# Patient Record
Sex: MALE | Race: WHITE | NOT HISPANIC OR LATINO | Employment: FULL TIME | URBAN - METROPOLITAN AREA
[De-identification: names, ages, dates, MRNs, and addresses within clinical notes are randomized per-mention and may not be internally consistent; named-entity substitution may affect disease eponyms.]

---

## 2024-03-12 ENCOUNTER — TELEPHONE (OUTPATIENT)
Dept: TRANSPLANT | Facility: CLINIC | Age: 39
End: 2024-03-12

## 2024-03-12 NOTE — TELEPHONE ENCOUNTER
----- Message from Elvia Arevalo sent at 3/12/2024  4:59 PM CDT -----    HCV referral rec'becky and scanned into media. Sent to HCV staff for scheduling.     Referring Provider: Fuad Lantigua MD  Phone: [phone]  Fax: [phone]  .

## 2024-03-13 ENCOUNTER — TELEPHONE (OUTPATIENT)
Dept: HEPATOLOGY | Facility: CLINIC | Age: 39
End: 2024-03-13
Payer: COMMERCIAL

## 2024-03-13 NOTE — TELEPHONE ENCOUNTER
----- Message from Elvia Arevalo sent at 3/12/2024  4:59 PM CDT -----    HCV referral rec'becky and scanned into media. Sent to HCV staff for scheduling.     Referring Provider: Fuad Lantigua MD  Phone: [phone]  Fax: [phone]  .

## 2024-03-14 ENCOUNTER — PROCEDURE VISIT (OUTPATIENT)
Dept: HEPATOLOGY | Facility: CLINIC | Age: 39
End: 2024-03-14
Payer: COMMERCIAL

## 2024-03-14 ENCOUNTER — LAB VISIT (OUTPATIENT)
Dept: LAB | Facility: HOSPITAL | Age: 39
End: 2024-03-14
Payer: COMMERCIAL

## 2024-03-14 ENCOUNTER — OFFICE VISIT (OUTPATIENT)
Dept: HEPATOLOGY | Facility: CLINIC | Age: 39
End: 2024-03-14
Payer: COMMERCIAL

## 2024-03-14 VITALS — BODY MASS INDEX: 41.37 KG/M2 | HEIGHT: 70 IN | WEIGHT: 289 LBS

## 2024-03-14 DIAGNOSIS — R74.8 ABNORMAL TRANSAMINASES: ICD-10-CM

## 2024-03-14 DIAGNOSIS — E66.01 CLASS 3 SEVERE OBESITY WITH BODY MASS INDEX (BMI) OF 40.0 TO 44.9 IN ADULT, UNSPECIFIED OBESITY TYPE, UNSPECIFIED WHETHER SERIOUS COMORBIDITY PRESENT: ICD-10-CM

## 2024-03-14 DIAGNOSIS — B18.2 CHRONIC HEPATITIS C WITHOUT HEPATIC COMA: ICD-10-CM

## 2024-03-14 DIAGNOSIS — B18.2 CHRONIC HEPATITIS C WITHOUT HEPATIC COMA: Primary | ICD-10-CM

## 2024-03-14 LAB
HAV IGG SER QL IA: NORMAL
HBV CORE AB SERPL QL IA: NORMAL
HBV SURFACE AB SER-ACNC: 93.58 MIU/ML
HBV SURFACE AB SER-ACNC: REACTIVE M[IU]/ML
HIV 1+2 AB+HIV1 P24 AG SERPL QL IA: NORMAL

## 2024-03-14 PROCEDURE — 99204 OFFICE O/P NEW MOD 45 MIN: CPT | Mod: S$GLB,,, | Performed by: PHYSICIAN ASSISTANT

## 2024-03-14 PROCEDURE — 86706 HEP B SURFACE ANTIBODY: CPT | Performed by: PHYSICIAN ASSISTANT

## 2024-03-14 PROCEDURE — 1160F RVW MEDS BY RX/DR IN RCRD: CPT | Mod: CPTII,S$GLB,, | Performed by: PHYSICIAN ASSISTANT

## 2024-03-14 PROCEDURE — 87389 HIV-1 AG W/HIV-1&-2 AB AG IA: CPT | Performed by: PHYSICIAN ASSISTANT

## 2024-03-14 PROCEDURE — 91200 LIVER ELASTOGRAPHY: CPT | Mod: S$GLB,,, | Performed by: PHYSICIAN ASSISTANT

## 2024-03-14 PROCEDURE — 1159F MED LIST DOCD IN RCRD: CPT | Mod: CPTII,S$GLB,, | Performed by: PHYSICIAN ASSISTANT

## 2024-03-14 PROCEDURE — 3008F BODY MASS INDEX DOCD: CPT | Mod: CPTII,S$GLB,, | Performed by: PHYSICIAN ASSISTANT

## 2024-03-14 PROCEDURE — 86704 HEP B CORE ANTIBODY TOTAL: CPT | Performed by: PHYSICIAN ASSISTANT

## 2024-03-14 PROCEDURE — 36415 COLL VENOUS BLD VENIPUNCTURE: CPT | Performed by: PHYSICIAN ASSISTANT

## 2024-03-14 PROCEDURE — 81596 NFCT DS CHRNC HCV 6 ASSAYS: CPT | Performed by: PHYSICIAN ASSISTANT

## 2024-03-14 PROCEDURE — 86790 VIRUS ANTIBODY NOS: CPT | Performed by: PHYSICIAN ASSISTANT

## 2024-03-14 PROCEDURE — 99999 PR PBB SHADOW E&M-EST. PATIENT-LVL III: CPT | Mod: PBBFAC,,, | Performed by: PHYSICIAN ASSISTANT

## 2024-03-14 RX ORDER — LISINOPRIL 20 MG/1
1 TABLET ORAL DAILY
COMMUNITY

## 2024-03-14 NOTE — PROCEDURES
FibroScan (Vibration Controlled Transient Elastography)    Date/Time: 3/14/2024 10:00 AM    Performed by: Scheuermann, Jennifer B., PA  Authorized by: Scheuermann, Jennifer B., PA    Diagnosis:  HCV    Probe:  XL    Universal Protocol: Patient's identity, procedure and site were verified, confirmatory pause was performed.  Discussed procedure including risks and potential complications.  Questions answered.  Patient verbalizes understanding and wishes to proceed with VCTE.     Procedure: After providing explanations of the procedure, patient was placed in the supine position with right arm in maximum abduction to allow optimal exposure of right lateral abdomen.  Patient was briefly assessed, Testing was performed in the mid-axillary location, 50Hz Shear Wave pulses were applied and the resulting Shear Wave and Propagation Speed detected with a 3.5 MHz ultrasonic signal, using the FibroScan probe, Skin to liver capsule distance and liver parenchyma were accessed during the entire examination with the FibroScan probe, Patient was instructed to breathe normally and to abstain from sudden movements during the procedure, allowing for random measurements of liver stiffness. At least 10 Shear Waves were produced, Individual measurements of each Shear Wave were calculated.  Patient tolerated the procedure well with no complications.  Meets discharge criteria as was dismissed.  Rates pain 0 out of 10.  Patient will follow up with ordering provider to review results.    Findings  Median liver stiffness score:  11.5  CAP Reading: dB/m:  263    IQR/med %:  15  Interpretation  Fibrosis interpretation is based on medial liver stiffness - Kilopascal (kPa).    Fibrosis Stage:  F3  Steatosis interpretation is based on controlled attenuation parameter - (dB/m).    Steatosis Grade:  S2

## 2024-03-14 NOTE — PROGRESS NOTES
HEPATOLOGY CLINIC VISIT NOTE - HCV clinic  OUTSIDE REFERRING PROVIDER: uFad Lantigua MD  CHIEF COMPLAINT: Hepatitis C   (accompanied by: wife)    HISTORY       This is a 39 y.o. White male with chronic hepatitis C, here for further eval / mngmt. Outside records have been received / reviewed.      HCV history:  Recent diagnosis following eval of abnl liver panel  Prior icteric illnesses: None    Risks for HCV:  Blood transfusion shortly after birth    - Prior Treatment: No  - Genotype 1A  - NS5A resistance not known  - HCV ,000 IU/mL - 2/2024    Liver staging:  No formal liver staging  No liver imaging  Labs - no evidence of liver dysfunction      Denies jaundice, dark urine, abdominal distention, hematemesis, melena, slowed mentation.   No abnormal skin rashes. No generalized joint pain.     PMH, PSH, SOCIAL HX, FAMILY HX      Reviewed in Epic  Pertinent findings:  FAMILY HX: neg for liver diease  SOCIAL HX: , 4 y/o son. Resides in [place]  Alcohol - no  Drugs - no    ROS: as per HPI, in addition:  (+) back pain    PHYSICAL EXAM:  Friendly White male, in no acute distress; alert and oriented to person, place and time. BMI 41  HEENT: Sclerae anicteric.   NECK: Supple  LUNGS: Normal respiratory effort.   ABDOMEN: Flat, soft, nontender. No organomegaly or masses. No ascites or hernias.   SKIN: Warm and dry. No jaundice, No obvious rashes.   EXTREMITIES: No lower extremity edema  NEURO/PSYCH: Normal gate. Memory intact. Thought and speech pattern appropriate. Behavior normal. No depression or anxiety noted.    PERTINENT DIAGNOSTIC RESULTS      Outside labs: 2/22/24            HBsAg neg      ASSESSMENT        39 y.o. White male with:  1. Chronic HCV, genotype 1A  - treatment naive  -- Elevated transaminases  -- HAV status - Unknown  -- HBV status - Unknown    2. Obesity w/ BMI 41    PLAN      1. Labs today  2. FibroScan today  3. U/S soon.  4. F/U visit in ~ 4-6 wks.     Goal of HCV Rx after liver  staging  Will attempt staging w/ fibroscan b/c ribs palpable on exam, but elevated BMI noted and will consider MR Elastography if results not clear     Orders Placed This Encounter   Procedures    FibroScan Transplant Hepatology(Vibration Controlled Transient Elastography)    US Abdomen Complete    Hepatitis B Surface Ab, Qualitative    Hepatitis B Core Antibody, Total    Hepatitis A antibody, IgG    HCV Fibrosure    HIV 1/2 Ag/Ab (4th Gen)     ____________________________________________________________  EDUCATION:  The natural history of Hepatitis C, including potential progression to cirrhosis was reviewed. We discussed the increased progression of liver disease secondary to alcohol use; patient was advised to avoid alcohol completely.     Transmission of Hepatitis C was reviewed, including possible sexual transmission. Sexual contacts should be screened. Patient should avoid sharing personal products such as razors, toothbrushes, etc.     Recommend avoiding raw seafood.  Limit acetaminophen to 2000mg daily.  ____________________________________________________________    Duration of encounter: 55 min  This includes face-to-face time and non face-to-face time preparing to see the patient (eg, review of tests), obtaining and/or reviewing separately obtained history, documenting clinical information in the electronic or other health record, independently interpreting resultsand communicating results to the patient/family/caregiver, or care coordination.

## 2024-03-15 LAB
A2 MACROGLOB SERPL-MCNC: 262 MG/DL (ref 100–280)
ALT SERPL W P-5'-P-CCNC: 89 U/L (ref 7–55)
APO A-I SERPL-MCNC: 110 MG/DL
BILIRUB SERPL-MCNC: 0.9 MG/DL (ref 0–1.2)
BIOPREDICTIVE SERIAL NUMBER: ABNORMAL
FIBROSIS STAGE SERPL QL: ABNORMAL
FIBROTEST INTERPRETATION: ABNORMAL
FIBROTEST-ACTITEST COMMENT: ABNORMAL
GGT SERPL-CCNC: 163 U/L (ref 8–61)
HAPTOGLOB SERPL-MCNC: <14 MG/DL (ref 30–200)
LIVER FIBR SCORE SERPL CALC.FIBROSURE: 0.87
NECROINFLAMMAT INTERP: ABNORMAL
NECROINFLAMMATORY ACT GRADE SERPL QL: ABNORMAL
NECROINFLAMMATORY ACT SCORE SERPL: 0.72

## 2024-04-11 ENCOUNTER — LAB VISIT (OUTPATIENT)
Dept: LAB | Facility: HOSPITAL | Age: 39
End: 2024-04-11
Payer: COMMERCIAL

## 2024-04-11 ENCOUNTER — OFFICE VISIT (OUTPATIENT)
Dept: HEPATOLOGY | Facility: CLINIC | Age: 39
End: 2024-04-11
Payer: COMMERCIAL

## 2024-04-11 ENCOUNTER — TELEPHONE (OUTPATIENT)
Dept: HEPATOLOGY | Facility: CLINIC | Age: 39
End: 2024-04-11
Payer: COMMERCIAL

## 2024-04-11 VITALS — BODY MASS INDEX: 41.47 KG/M2 | WEIGHT: 289.69 LBS | HEIGHT: 70 IN

## 2024-04-11 DIAGNOSIS — K74.02 ADVANCED HEPATIC FIBROSIS: ICD-10-CM

## 2024-04-11 DIAGNOSIS — B18.2 CHRONIC HEPATITIS C WITHOUT HEPATIC COMA: Primary | ICD-10-CM

## 2024-04-11 DIAGNOSIS — K76.0 FATTY LIVER: ICD-10-CM

## 2024-04-11 DIAGNOSIS — K74.02 ADVANCED HEPATIC FIBROSIS: Primary | ICD-10-CM

## 2024-04-11 LAB — AFP SERPL-MCNC: 6.1 NG/ML (ref 0–8.4)

## 2024-04-11 PROCEDURE — 4010F ACE/ARB THERAPY RXD/TAKEN: CPT | Mod: CPTII,S$GLB,, | Performed by: PHYSICIAN ASSISTANT

## 2024-04-11 PROCEDURE — 99214 OFFICE O/P EST MOD 30 MIN: CPT | Mod: S$GLB,,, | Performed by: PHYSICIAN ASSISTANT

## 2024-04-11 PROCEDURE — 1160F RVW MEDS BY RX/DR IN RCRD: CPT | Mod: CPTII,S$GLB,, | Performed by: PHYSICIAN ASSISTANT

## 2024-04-11 PROCEDURE — 3008F BODY MASS INDEX DOCD: CPT | Mod: CPTII,S$GLB,, | Performed by: PHYSICIAN ASSISTANT

## 2024-04-11 PROCEDURE — 1159F MED LIST DOCD IN RCRD: CPT | Mod: CPTII,S$GLB,, | Performed by: PHYSICIAN ASSISTANT

## 2024-04-11 PROCEDURE — 82105 ALPHA-FETOPROTEIN SERUM: CPT | Performed by: PHYSICIAN ASSISTANT

## 2024-04-11 PROCEDURE — 36415 COLL VENOUS BLD VENIPUNCTURE: CPT | Performed by: PHYSICIAN ASSISTANT

## 2024-04-11 PROCEDURE — 99999 PR PBB SHADOW E&M-EST. PATIENT-LVL III: CPT | Mod: PBBFAC,,, | Performed by: PHYSICIAN ASSISTANT

## 2024-04-11 RX ORDER — GLECAPREVIR AND PIBRENTASVIR 40; 100 MG/1; MG/1
3 TABLET, FILM COATED ORAL DAILY
Qty: 84 TABLET | Refills: 1 | Status: ACTIVE | OUTPATIENT
Start: 2024-04-11

## 2024-04-11 NOTE — PROGRESS NOTES
HEPATOLOGY CLINIC VISIT NOTE - HCV clinic  CHIEF COMPLAINT: Hepatitis C   (accompanied by: wife)    HISTORY       This is a 39 y.o. White male with chronic hepatitis C, here for f/u    HCV history:  Recent diagnosis following eval of abnl liver panel  Prior icteric illnesses: None  Risks for HCV:  Blood transfusion shortly after birth  - Prior Treatment: No  - Genotype 1A  - NS5A resistance not known  - HCV ,000 IU/mL - 2/2024    Liver staging:  No formal liver staging  No liver imaging  Labs - no evidence of liver dysfunction      Interval history (4/11/24):  Labs: (+) HBV immunity through vaccine, Lacking HAV immunity, HIV neg    Evidence of advanced liver fibrosis:   FibroSURE: F4, FibroScan: F3, SM 15cm   Liver disease well compensated   ?portal HTN: SM but plt normal    Evidence of steatotic liver: S2 on FibroScan    Current symptoms of hepatic decompensation:  Ascites / IRINA - no   Diuretic use - no  TBili elevation - no  HE / confusion / memory problems - no  EV bleed / hematemesis / melena - no      PMH, PSH, SOCIAL HX, FAMILY HX      Reviewed in Epic  Pertinent findings:  FAMILY HX: neg for liver diease  SOCIAL HX: , 4 y/o son. Resides in [place]  Alcohol - no  Drugs - no    ROS: as per HPI    PHYSICAL EXAM:  Friendly White male, in no acute distress; alert and oriented to person, place and time. BMI 41  HEENT: Sclerae anicteric.   NECK: Supple  LUNGS: Normal respiratory effort.   ABDOMEN: Flat, soft, nontender.   SKIN: Warm and dry. No jaundice, No obvious rashes.   EXTREMITIES: No lower extremity edema  NEURO/PSYCH: Normal gate. Memory intact. Thought and speech pattern appropriate. Behavior normal. No depression or anxiety noted.    PERTINENT DIAGNOSTIC RESULTS      FibroScan 3/14/24: kPa 11.5 (F3),  (S2)    Outside labs: 2/22/24              HBsAg neg      US Abdomen Complete - 4/2/24  CLINICAL HISTORY:  Chronic viral hepatitis CHCV; with spleen size for portal HTN  assessment;    FINDINGS:  The liver demonstrates a diffusely increased echotexture without focal lesion. No gallstones. The common duct measures 5 mm. The hepatic and portal veins are patent.  There is no hydronephrosis.  Visualized portions of the pancreas unremarkable.  Mild splenomegaly measuring 15 cm in length.  The visualized portions of the abdominal aorta and IVC show no gross abnormality.    Impression  1. Increased hepatic echotexture suggesting steatosis or hepatocellular disease.  2. Mild splenomegaly.      ASSESSMENT        39 y.o. White male with:  1. Chronic HCV, genotype 1A  - treatment naive  -- Elevated transaminases  -- HAV status - Lacking immunity  -- HBV status - Prior vaccination w/ immunity    2. Advanced liver fibrosis / early cirrhosis  -- FibroScan F3, FibroSURE F4  -- HCC screen: U/S 4/2024 ok, needs AFP  -- Portal HTN?: (+) SM, needs EGD    3. MASLD  -- Obesity w/ BMI 41  -- Elevated transaminases    PLAN      1. Labs today: AFP  -- HCC screening every 6 months, due 10/2024 if AFP ok.  2. Mavyret x 8 weeks sent to [facility]   -- Patient will notify me of treatment start date so lab f/u can be scheduled.  3. HAV vaccine sent to [facility]. See PCP if not covered.   4. F/U visit during HCV Rx: Further discuss cirrhosis, need for EGD, fatty liver    Discussed presence of advanced liver fibrosis, risk of HCC, need for 6 month monitoring    Cc: Fuad Lantigua MD    ______________________________________________________________________________  EDUCATION:  HCV RX  Discussed goal of HCV eradication to prevent progression of liver disease.  Discussed use of Mavyret (3 pills daily with food) x 8 weeks with potential side effects of fatigue, headache.    Discussed potential for drug interactions with Mavyret.  Current med list reviewed - no interactions noted.    Discussed importance of medication adherence and risk of treatment failure / viral resistance if not adherent. Pt has  verbalized understanding.    ____________________________________________________________    Duration of encounter: 37 min  This includes face-to-face time and non face-to-face time preparing to see the patient (eg, review of tests), obtaining and/or reviewing separately obtained history, documenting clinical information in the electronic or other health record, independently interpreting resultsand communicating results to the patient/family/caregiver, or care coordination.

## 2024-04-11 NOTE — TELEPHONE ENCOUNTER
----- Message from Jennifer B. Scheuermann, PA sent at 4/11/2024  2:13 PM CDT -----  Pls schedule in 10/2024: CBC, CMP,  INR, AFP, U/S, VISIT (can be video visit if easier)

## 2024-04-11 NOTE — TELEPHONE ENCOUNTER
I spoke with patient.  Testing scheduled 10/1 and f/u visit scheduled 10/4; appt reminder notice mailed.

## 2024-04-25 ENCOUNTER — TELEPHONE (OUTPATIENT)
Dept: HEPATOLOGY | Facility: CLINIC | Age: 39
End: 2024-04-25
Payer: COMMERCIAL

## 2024-04-25 DIAGNOSIS — B18.2 CHRONIC HEPATITIS C WITHOUT HEPATIC COMA: Primary | ICD-10-CM

## 2024-04-25 NOTE — TELEPHONE ENCOUNTER
I spoke with patient and msg from PA Scheuermann relayed and mailed to him.  Labs scheduled 5/23/24 and f/u visit scheduled 5/24/24.  SVR labs added to draw already scheduled on 10/1/24.

## 2024-04-25 NOTE — TELEPHONE ENCOUNTER
Pt beginning 8 weeks Mavyret on 4/26/24  Anticipated treatment end date: 6/20/24  F 3-4  Allegra 1A  Prior HCV treatment: No      Pls update episode and schedule:  - LFT, HCV RNA at week ~ 4  - visit (video is fine if easier) in ~ 4-5 wks  - LFT, HCV RNA - SVR12 - 9/20/24

## 2024-05-24 ENCOUNTER — OFFICE VISIT (OUTPATIENT)
Dept: HEPATOLOGY | Facility: CLINIC | Age: 39
End: 2024-05-24
Payer: COMMERCIAL

## 2024-05-24 DIAGNOSIS — E66.01 CLASS 3 SEVERE OBESITY WITH BODY MASS INDEX (BMI) OF 40.0 TO 44.9 IN ADULT, UNSPECIFIED OBESITY TYPE, UNSPECIFIED WHETHER SERIOUS COMORBIDITY PRESENT: ICD-10-CM

## 2024-05-24 DIAGNOSIS — R74.8 ABNORMAL TRANSAMINASES: ICD-10-CM

## 2024-05-24 DIAGNOSIS — K76.0 FATTY LIVER: ICD-10-CM

## 2024-05-24 DIAGNOSIS — B18.2 CHRONIC HEPATITIS C WITHOUT HEPATIC COMA: ICD-10-CM

## 2024-05-24 DIAGNOSIS — K74.60 HEPATIC CIRRHOSIS, UNSPECIFIED HEPATIC CIRRHOSIS TYPE, UNSPECIFIED WHETHER ASCITES PRESENT: Primary | ICD-10-CM

## 2024-05-24 PROCEDURE — 99214 OFFICE O/P EST MOD 30 MIN: CPT | Mod: 95,,, | Performed by: PHYSICIAN ASSISTANT

## 2024-05-24 PROCEDURE — 1159F MED LIST DOCD IN RCRD: CPT | Mod: CPTII,95,, | Performed by: PHYSICIAN ASSISTANT

## 2024-05-24 PROCEDURE — 1160F RVW MEDS BY RX/DR IN RCRD: CPT | Mod: CPTII,95,, | Performed by: PHYSICIAN ASSISTANT

## 2024-05-24 PROCEDURE — 4010F ACE/ARB THERAPY RXD/TAKEN: CPT | Mod: CPTII,95,, | Performed by: PHYSICIAN ASSISTANT

## 2024-05-24 NOTE — PROGRESS NOTES
HEPATOLOGY VIDEO VISIT NOTE - HCV clinic  Visit type: audiovisual    Each patient to whom he or she provides medical services by telemedicine is:  (1) informed of the relationship between the physician and patient and the respective role of any other health care provider with respect to management of the patient; and (2) notified that he or she may decline to receive medical services by telemedicine and may withdraw from such care at any time.    CHIEF COMPLAINT: Hepatitis C, Cirrhosis     HISTORY       This is a 39 y.o. White male with advanced liver fibrosis, HCV, being seen for f/u    HCV history:  Recent diagnosis following eval of abnl liver panel  Prior icteric illnesses: None  Risks for HCV:  Blood transfusion shortly after birth  - Prior Treatment: No  - Genotype 1A  - NS5A resistance not known  - HCV ,000 IU/mL - 2/2024    Cirrhosis history:  FibroScan 3/14/24: kPa 11.5 (F3),  (S2)  FibroSURE 3/14/24: A3, F4  Liver disease is well compensated  (+) Portal HTN: SM 15cm. Normal plt    Cirrhosis maintenance:  Varices screening - EGD needed  HAV immunity - Lacking immunity - Rx prescribed last visit  HBV status - Prior vaccination w/ immunity: Pos HBsAb (neg HBsAg, neg HBcAb)      Interval history (4/11/24):  Staging tests confirm advanced liver fibrosis: F3-4, concern for portal HTN  No s/s liver decompensation  Mavyret for HCV prescribed    Interval history (5/24/24):  Began 8 weeks mavyret on 4/26/24   Virologic response: RNA pending   Adherence issues: no   Tolerability issues: no    AFP after last visit: normal  Denies jaundice, dark urine, hematemesis, melena, slowed mentation, abdominal distention.       PMH, PSH, SOCIAL HX, FAMILY HX      Reviewed in Epic  Pertinent findings:  FAMILY HX: neg for liver diease  SOCIAL HX: , 4 y/o son. Resides in [place]  Alcohol - no  Drugs - no    ROS: as per HPI    PHYSICAL EXAM:  Friendly White male, in no acute distress; alert and oriented to person,  place and time  LUNGS: Normal respiratory effort.  NEURO/PSYCH: Memory intact. Thought and speech pattern appropriate. Behavior normal. No depression or anxiety noted.    PERTINENT DIAGNOSTIC RESULTS      Outside labs: 2/22/24              HBsAg neg      US Abdomen Complete - 4/2/24  CLINICAL HISTORY:  Chronic viral hepatitis CHCV; with spleen size for portal HTN assessment;    FINDINGS:  The liver demonstrates a diffusely increased echotexture without focal lesion. No gallstones. The common duct measures 5 mm. The hepatic and portal veins are patent.  There is no hydronephrosis.  Visualized portions of the pancreas unremarkable.  Mild splenomegaly measuring 15 cm in length.  The visualized portions of the abdominal aorta and IVC show no gross abnormality.    Impression  1. Increased hepatic echotexture suggesting steatosis or hepatocellular disease.  2. Mild splenomegaly.      ASSESSMENT        39 y.o. White male with:  1. Chronic HCV, genotype 1A  - on mavyret  -- Elevated transaminases - improving on Rx    2. Advanced liver fibrosis / early cirrhosis  -- FibroScan F3, FibroSURE F4  -- HCC screen: Up to date 4/2024  -- Portal HTN?: (+) SM, needs EGD    3. MASLD: Metabolic dysfunction associated steatotic liver disease  -- Obesity w/ BMI 41  -- Elevated transaminases  -- Recent lipid panel ok  -- no DM    PLAN      1. Continue Mavyret x 8 weeks w/ labs for SVR as planned  2. EGD for EV screen: pt to see if Dr Lantigua can do this; if not will schedule at [facility]  3. CBC, CMP, INR, AFP, U/S, VISIT every 6 months: due 10/2024.   4. Discussed goal of wt loss for mngmt of MASLD. Specifically discussed diet changes, avoiding sugary / high calorie drinks    Cc: Fuad Lantigua MD    ______________________________________________________________________________  EDUCATION:  CIRRHOSIS  Discussed evidence that indicates that pt has cirrhosis.   -- Discussed liver fibrosis/scarring and relation to liver function. Reviewed  significance of MELD scoring system as it relates to indication for transplant.  -- Signs and symptoms of hepatic decompensation were reviewed, including jaundice, ascites, and slowed mentation due to hepatic encephalopathy. The patient should seek medical attention if any of these things occur.   --  We discussed the potential for bleeding from esophageal varices with symptoms of hematemesis and melena. The patient should report to the Emergency Department for these symptoms.   -- We discussed the increased risk of hepatocellular carcinoma due to cirrhosis. Lifelong screening every six months with ultrasound and AFP is recommended.   -- Discussed portal hypertension, including potential development of esophageal varices. Role of EGD in screening for varices was reviewed.     -- Tylenol (acetaminophen) is okay up to 2,000mg daily  -- Avoid NSAIDs  -- Avoid alcohol       ____________________________________________________________    Duration of encounter: 38 min  This includes face-to-face time and non face-to-face time preparing to see the patient (eg, review of tests), obtaining and/or reviewing separately obtained history, documenting clinical information in the electronic or other health record, independently interpreting resultsand communicating results to the patient/family/caregiver, or care coordination.

## 2024-05-27 ENCOUNTER — TELEPHONE (OUTPATIENT)
Dept: HEPATOLOGY | Facility: CLINIC | Age: 39
End: 2024-05-27
Payer: COMMERCIAL

## 2024-05-27 NOTE — TELEPHONE ENCOUNTER
Pt beginning 8 weeks Mavyret on 4/26/24  Anticipated treatment end date: 6/20/24  F 3-4  Allegra 1A  Prior HCV treatment: No      5/23  LFT ok, HCV neg    Cont mavyret    Next labs:  - LFT, HCV RNA - SVR12 - 9/20/24      Pt notified via [facility]

## 2024-10-04 ENCOUNTER — OFFICE VISIT (OUTPATIENT)
Dept: HEPATOLOGY | Facility: CLINIC | Age: 39
End: 2024-10-04
Payer: COMMERCIAL

## 2024-10-04 ENCOUNTER — PATIENT MESSAGE (OUTPATIENT)
Dept: HEPATOLOGY | Facility: CLINIC | Age: 39
End: 2024-10-04

## 2024-10-04 ENCOUNTER — TELEPHONE (OUTPATIENT)
Dept: HEPATOLOGY | Facility: CLINIC | Age: 39
End: 2024-10-04
Payer: COMMERCIAL

## 2024-10-04 DIAGNOSIS — E66.01 CLASS 3 SEVERE OBESITY WITH BODY MASS INDEX (BMI) OF 40.0 TO 44.9 IN ADULT, UNSPECIFIED OBESITY TYPE, UNSPECIFIED WHETHER SERIOUS COMORBIDITY PRESENT: ICD-10-CM

## 2024-10-04 DIAGNOSIS — K76.0 METABOLIC DYSFUNCTION-ASSOCIATED STEATOTIC LIVER DISEASE (MASLD): ICD-10-CM

## 2024-10-04 DIAGNOSIS — Z86.19 HEPATITIS C VIRUS INFECTION CURED AFTER ANTIVIRAL DRUG THERAPY: Primary | ICD-10-CM

## 2024-10-04 DIAGNOSIS — E66.813 CLASS 3 SEVERE OBESITY WITH BODY MASS INDEX (BMI) OF 40.0 TO 44.9 IN ADULT, UNSPECIFIED OBESITY TYPE, UNSPECIFIED WHETHER SERIOUS COMORBIDITY PRESENT: ICD-10-CM

## 2024-10-04 PROBLEM — B18.2 CHRONIC HEPATITIS C: Status: RESOLVED | Noted: 2024-03-14 | Resolved: 2024-10-04

## 2024-10-04 NOTE — PROGRESS NOTES
HEPATOLOGY VIDEO VISIT NOTE - HCV clinic  Visit type: audiovisual    Each patient to whom he or she provides medical services by telemedicine is:  (1) informed of the relationship between the physician and patient and the respective role of any other health care provider with respect to management of the patient; and (2) notified that he or she may decline to receive medical services by telemedicine and may withdraw from such care at any time.    CHIEF COMPLAINT: Hepatitis C, Cirrhosis     HISTORY       This is a 39 y.o. White male with advanced liver fibrosis, HCV, being seen for f/u    HCV history:  Recent diagnosis following eval of abnl liver panel  Prior icteric illnesses: None  Risks for HCV:  Blood transfusion shortly after birth  - Prior Treatment: No  - Genotype 1A  - NS5A resistance not known    Cirrhosis history:  FibroScan 3/14/24: kPa 11.5 (F3),  (S2) // FibroSURE 3/14/24: A3, F4  Liver disease is well compensated  (+) Portal HTN: SM 15cm. Normal plt    Cirrhosis maintenance:  Varices screening - EGD needed  HAV immunity - Lacking immunity - Rx prescribed last visit  HBV status - Prior vaccination w/ immunity: Pos HBsAb (neg HBsAg, neg HBcAb)    Steatotic liver:  FibroScan 3/14/24: kPa 11.5 (F3),  (S2)    Interval history (4/11/24):  Staging tests confirm advanced liver fibrosis: F3-4, concern for portal HTN  No s/s liver decompensation    Interval history (5/24/24):  Began 8 weeks mavyret on 4/26/24. No adherence / tolerability issues.   AFP after last visit: normal  No s/s liver decompensation    Interval history (10/4/24):  EGD 6/2024: No EV (Dr Lantigua)  Completed mavyret, HCV neg 10/1: confirms cure (SVR12)    Routine cirrhosis labs / imaging  U/S 10/2024: no liver lesions. No ascites  Labs 10/1/24: stable. AFP, liver panel normal. MELD 6    Current liver sx:  Ascites / IRINA - no   Diuretic use - no  TBili elevation - no  HE / confusion / memory problems - no  EV bleed / hematemesis  / melena - no    MASLD update:  - changed to 2% milk  - choosing snacks w/ higher protein  - recently started walking, 45 min daily  - no wt loss  - no updated lipid profile      PMH, PSH, SOCIAL HX, FAMILY HX      Reviewed in Epic  Pertinent findings:  FAMILY HX: neg for liver diease  SOCIAL HX: , 6 y/o son. Resides in [place]  Alcohol - no  Drugs - no    ROS: as per HPI    PHYSICAL EXAM:  Friendly White male, in no acute distress; alert and oriented to person, place and time  LUNGS: Normal respiratory effort.  NEURO/PSYCH: Memory intact. Thought and speech pattern appropriate. Behavior normal. No depression or anxiety noted.    PERTINENT DIAGNOSTIC RESULTS      Lab Results   Component Value Date    WBC 7.00 10/01/2024    HGB 16.1 10/01/2024     10/01/2024    INR 1.0 10/01/2024     Lab Results   Component Value Date     10/01/2024    K 4.5 10/01/2024    BUN 22 (H) 10/01/2024    CREATININE 1.01 10/01/2024    ALBUMIN 4.6 10/01/2024    ALKPHOS 48 10/01/2024    BILITOT 0.8 10/01/2024    AST 33 10/01/2024    ALT 28 10/01/2024    AFP 3.1 10/01/2024     US ABDOMEN LIMITED_LIVER - 10/1/24  CLINICAL HISTORY:Hepatic fibrosis   COMPARISON:None     FINDINGS:  Liver exhibits a nonspecific mildly heterogeneous echotexture.  No hepatic lesion identified.  Portal and hepatic veins are patent with appropriate flow.  Gallbladder is unremarkable.  CBD measures within normal limits at 3 mm.  Visualized aspects of the pancreas and right kidney are unremarkable.     Impression:  Nonspecific mildly heterogeneous hepatic echotexture.      ASSESSMENT        39 y.o. White male with:  1. History of HCV, treated / cured  -- s/p mavyret, SVR12 - 10/2024    2. Advanced liver fibrosis / early cirrhosis  -- FibroScan F3, FibroSURE F4  -- HCC screen: Up to date 10/2024  -- EGD 6/2024: no EV    3. MASLD: Metabolic dysfunction associated steatotic liver disease  -- Risks: Obesity w/ BMI 41  -- Hx of elevated transaminases -> now  normal w/ HCV cure and diet changes  -- 2/2024 lipid panel ok  -- no DM    PLAN      1. CBC, CMP, INR, AFP, U/S every 6 months: due 4/2025  2. HCV RNA w/ next labs  3. Continued goal of wt loss:  High protein, high fiber, low carb diet; Exercise  4. Yearly visit: due 10/2025, earlier PRN  5. EGD due 10/2027    Cc: Fuad Lantigua MD       ____________________________________________________________    Duration of encounter: 31 min  This includes face-to-face time and non face-to-face time preparing to see the patient (eg, review of tests), obtaining and/or reviewing separately obtained history, documenting clinical information in the electronic or other health record, independently interpreting resultsand communicating results to the patient/family/caregiver, or care coordination.

## 2024-10-04 NOTE — TELEPHONE ENCOUNTER
----- Message from Jennifer Scheuermann, PA sent at 10/4/2024  3:47 PM CDT -----  Pls schedule  CBC, CMP, INR, AFP, U/S, HCV RNA in 4/2025

## 2025-04-03 ENCOUNTER — RESULTS FOLLOW-UP (OUTPATIENT)
Dept: HEPATOLOGY | Facility: CLINIC | Age: 40
End: 2025-04-03

## 2025-04-03 ENCOUNTER — TELEPHONE (OUTPATIENT)
Dept: HEPATOLOGY | Facility: CLINIC | Age: 40
End: 2025-04-03
Payer: COMMERCIAL

## 2025-04-03 DIAGNOSIS — K74.60 HEPATIC CIRRHOSIS, UNSPECIFIED HEPATIC CIRRHOSIS TYPE, UNSPECIFIED WHETHER ASCITES PRESENT: Primary | ICD-10-CM

## 2025-04-03 NOTE — TELEPHONE ENCOUNTER
4/1  labs and ultrasound reviewed - stable    Please schedule: CBC, CMP, INR, AFP, U/S, VISIT in 10/2025    Notified via [facility]    Thanks

## 2025-08-06 ENCOUNTER — PATIENT MESSAGE (OUTPATIENT)
Dept: HEPATOLOGY | Facility: CLINIC | Age: 40
End: 2025-08-06
Payer: COMMERCIAL